# Patient Record
Sex: FEMALE | Race: OTHER | HISPANIC OR LATINO | Employment: UNEMPLOYED | URBAN - METROPOLITAN AREA
[De-identification: names, ages, dates, MRNs, and addresses within clinical notes are randomized per-mention and may not be internally consistent; named-entity substitution may affect disease eponyms.]

---

## 2022-11-09 ENCOUNTER — APPOINTMENT (EMERGENCY)
Dept: RADIOLOGY | Facility: HOSPITAL | Age: 47
End: 2022-11-09

## 2022-11-09 ENCOUNTER — HOSPITAL ENCOUNTER (EMERGENCY)
Facility: HOSPITAL | Age: 47
Discharge: HOME | End: 2022-11-10
Attending: EMERGENCY MEDICINE

## 2022-11-09 DIAGNOSIS — R05.9 COUGH: ICD-10-CM

## 2022-11-09 DIAGNOSIS — R06.02 SHORTNESS OF BREATH: ICD-10-CM

## 2022-11-09 DIAGNOSIS — R51.9 HEADACHE: Primary | ICD-10-CM

## 2022-11-09 DIAGNOSIS — I10 HIGH BLOOD PRESSURE: ICD-10-CM

## 2022-11-09 LAB
ALBUMIN SERPL BCP-MCNC: 3.6 G/DL (ref 3.5–5)
ALP SERPL-CCNC: 131 U/L (ref 46–116)
ALT SERPL W P-5'-P-CCNC: 31 U/L (ref 12–78)
ANION GAP SERPL CALCULATED.3IONS-SCNC: 7 MMOL/L (ref 4–13)
BASOPHILS # BLD AUTO: 0.05 THOUSANDS/ÂΜL (ref 0–0.1)
BASOPHILS NFR BLD AUTO: 0 % (ref 0–1)
BILIRUB SERPL-MCNC: 0.28 MG/DL (ref 0.2–1)
BUN SERPL-MCNC: 14 MG/DL (ref 5–25)
CALCIUM SERPL-MCNC: 9 MG/DL (ref 8.3–10.1)
CHLORIDE SERPL-SCNC: 102 MMOL/L (ref 96–108)
CO2 SERPL-SCNC: 31 MMOL/L (ref 21–32)
CREAT SERPL-MCNC: 0.83 MG/DL (ref 0.6–1.3)
EOSINOPHIL # BLD AUTO: 1.02 THOUSAND/ÂΜL (ref 0–0.61)
EOSINOPHIL NFR BLD AUTO: 7 % (ref 0–6)
ERYTHROCYTE [DISTWIDTH] IN BLOOD BY AUTOMATED COUNT: 13 % (ref 11.6–15.1)
GFR SERPL CREATININE-BSD FRML MDRD: 85 ML/MIN/1.73SQ M
GLUCOSE SERPL-MCNC: 124 MG/DL (ref 65–140)
HCT VFR BLD AUTO: 44.9 % (ref 34.8–46.1)
HGB BLD-MCNC: 14.4 G/DL (ref 11.5–15.4)
IMM GRANULOCYTES # BLD AUTO: 0.04 THOUSAND/UL (ref 0–0.2)
IMM GRANULOCYTES NFR BLD AUTO: 0 % (ref 0–2)
LYMPHOCYTES # BLD AUTO: 2.75 THOUSANDS/ÂΜL (ref 0.6–4.47)
LYMPHOCYTES NFR BLD AUTO: 19 % (ref 14–44)
MCH RBC QN AUTO: 26.7 PG (ref 26.8–34.3)
MCHC RBC AUTO-ENTMCNC: 32.1 G/DL (ref 31.4–37.4)
MCV RBC AUTO: 83 FL (ref 82–98)
MONOCYTES # BLD AUTO: 0.98 THOUSAND/ÂΜL (ref 0.17–1.22)
MONOCYTES NFR BLD AUTO: 7 % (ref 4–12)
NEUTROPHILS # BLD AUTO: 9.38 THOUSANDS/ÂΜL (ref 1.85–7.62)
NEUTS SEG NFR BLD AUTO: 67 % (ref 43–75)
NRBC BLD AUTO-RTO: 0 /100 WBCS
PLATELET # BLD AUTO: 285 THOUSANDS/UL (ref 149–390)
PMV BLD AUTO: 9.1 FL (ref 8.9–12.7)
POTASSIUM SERPL-SCNC: 3.9 MMOL/L (ref 3.5–5.3)
PROT SERPL-MCNC: 8.5 G/DL (ref 6.4–8.4)
RBC # BLD AUTO: 5.4 MILLION/UL (ref 3.81–5.12)
SODIUM SERPL-SCNC: 140 MMOL/L (ref 135–147)
WBC # BLD AUTO: 14.22 THOUSAND/UL (ref 4.31–10.16)

## 2022-11-09 RX ORDER — KETOROLAC TROMETHAMINE 30 MG/ML
15 INJECTION, SOLUTION INTRAMUSCULAR; INTRAVENOUS ONCE
Status: COMPLETED | OUTPATIENT
Start: 2022-11-09 | End: 2022-11-09

## 2022-11-09 RX ORDER — ALBUTEROL SULFATE 2.5 MG/3ML
5 SOLUTION RESPIRATORY (INHALATION) ONCE
Status: COMPLETED | OUTPATIENT
Start: 2022-11-09 | End: 2022-11-09

## 2022-11-09 RX ORDER — METOCLOPRAMIDE HYDROCHLORIDE 5 MG/ML
10 INJECTION INTRAMUSCULAR; INTRAVENOUS ONCE
Status: COMPLETED | OUTPATIENT
Start: 2022-11-10 | End: 2022-11-09

## 2022-11-09 RX ORDER — DIPHENHYDRAMINE HYDROCHLORIDE 50 MG/ML
25 INJECTION INTRAMUSCULAR; INTRAVENOUS ONCE
Status: COMPLETED | OUTPATIENT
Start: 2022-11-10 | End: 2022-11-09

## 2022-11-09 RX ADMIN — METOCLOPRAMIDE 10 MG: 5 INJECTION, SOLUTION INTRAMUSCULAR; INTRAVENOUS at 23:53

## 2022-11-09 RX ADMIN — KETOROLAC TROMETHAMINE 15 MG: 30 INJECTION, SOLUTION INTRAMUSCULAR; INTRAVENOUS at 23:17

## 2022-11-09 RX ADMIN — ALBUTEROL SULFATE 5 MG: 2.5 SOLUTION RESPIRATORY (INHALATION) at 23:18

## 2022-11-09 RX ADMIN — DIPHENHYDRAMINE HYDROCHLORIDE 25 MG: 50 INJECTION, SOLUTION INTRAMUSCULAR; INTRAVENOUS at 23:53

## 2022-11-09 RX ADMIN — IPRATROPIUM BROMIDE 0.5 MG: 0.5 SOLUTION RESPIRATORY (INHALATION) at 23:18

## 2022-11-09 RX ADMIN — SODIUM CHLORIDE 1000 ML: 0.9 INJECTION, SOLUTION INTRAVENOUS at 23:15

## 2022-11-10 VITALS
RESPIRATION RATE: 20 BRPM | OXYGEN SATURATION: 99 % | SYSTOLIC BLOOD PRESSURE: 168 MMHG | HEART RATE: 88 BPM | WEIGHT: 131.17 LBS | DIASTOLIC BLOOD PRESSURE: 101 MMHG | TEMPERATURE: 97.7 F

## 2022-11-10 LAB
FLUAV RNA RESP QL NAA+PROBE: NEGATIVE
FLUBV RNA RESP QL NAA+PROBE: NEGATIVE
RSV RNA RESP QL NAA+PROBE: NEGATIVE
SARS-COV-2 RNA RESP QL NAA+PROBE: NEGATIVE

## 2022-11-10 RX ORDER — GUAIFENESIN/DEXTROMETHORPHAN 100-10MG/5
5 SYRUP ORAL 3 TIMES DAILY PRN
Qty: 473 ML | Refills: 0 | Status: SHIPPED | OUTPATIENT
Start: 2022-11-10

## 2022-11-10 RX ORDER — ALBUTEROL SULFATE 90 UG/1
2 AEROSOL, METERED RESPIRATORY (INHALATION) EVERY 6 HOURS PRN
Qty: 6.7 G | Refills: 0 | Status: SHIPPED | OUTPATIENT
Start: 2022-11-10

## 2022-11-10 RX ORDER — IBUPROFEN 600 MG/1
600 TABLET ORAL EVERY 6 HOURS PRN
Qty: 60 TABLET | Refills: 0 | Status: SHIPPED | OUTPATIENT
Start: 2022-11-10

## 2022-11-10 NOTE — DISCHARGE INSTRUCTIONS
-peña Motrin 600 mg cada 6 horas para el dolor de chidi  -beber mucha agua  -use el inhalador de albuterol según sea necesario para la dificultad para respirar  -Programe joan skyla con el médico de [address] medicamentos para la tos según sea necesario

## 2022-11-10 NOTE — ED PROVIDER NOTES
History  Chief Complaint   Patient presents with   • Shortness of Breath     Pt reports having SOB and cough for the past couple of days  Pt reports shortness of breath when walking  Pt complains of a headache and pain in her lower back  Pt reports it hurts to breath  This is a 68-year-old female with no significant past medical history who presents today with shortness of breath, cough and headache  x2-3 days  Patient reports "my lungs hurt" when she breathes  Also has back pain when she takes a deep breath  States she gets very winded after only walking short distances  Patient also reports a frontal headache that was gradual in onset  Denies any changes in vision with this  Denies it worsening with position change, light or sound  Patient also admits to fever, when asked how high it was she states "it was low"  Patient admits to taking "lots of medications" however when asked specifically what she took today she states that she took ibuprofen around 1:30 p m   States that she took other medication 2 days ago and felt her symptoms got better however today they returned  Denies any abdominal pain, nausea, vomiting, diarrhea, chest pain, lightheadedness, dizziness  Language barrier: Yes, [employer] interpretor used  History obtained from: patient          None       History reviewed  No pertinent past medical history  History reviewed  No pertinent surgical history  History reviewed  No pertinent family history  I have reviewed and agree with the history as documented  E-Cigarette/Vaping     E-Cigarette/Vaping Substances     Social History     Tobacco Use   • Smoking status: Never Smoker   • Smokeless tobacco: Never Used   Substance Use Topics   • Alcohol use: Never   • Drug use: Never       Review of Systems   Constitutional: Positive for chills and fever  HENT: Positive for congestion  Eyes: Negative for photophobia     Respiratory: Positive for chest tightness and shortness of breath  Gastrointestinal: Negative for abdominal pain, diarrhea, nausea and vomiting  Musculoskeletal: Negative for neck pain  Skin: Negative for rash  Neurological: Positive for headaches  Negative for dizziness and light-headedness  All other systems reviewed and are negative  Physical Exam  Physical Exam  Vitals and nursing note reviewed  Constitutional:       General: She is in acute distress  Appearance: She is well-developed and normal weight  She is ill-appearing  HENT:      Head: Normocephalic and atraumatic  Eyes:      Conjunctiva/sclera: Conjunctivae normal    Cardiovascular:      Rate and Rhythm: Normal rate and regular rhythm  Heart sounds: No murmur heard  Pulmonary:      Effort: Pulmonary effort is normal  No respiratory distress  Breath sounds: Wheezing (expiratory wheezing throughout) present  Abdominal:      Palpations: Abdomen is soft  Tenderness: There is no abdominal tenderness  Musculoskeletal:      Cervical back: Neck supple  Skin:     General: Skin is warm and dry  Capillary Refill: Capillary refill takes less than 2 seconds  Neurological:      Mental Status: She is alert and oriented to person, place, and time     Psychiatric:         Mood and Affect: Mood normal          Behavior: Behavior normal          Vital Signs  ED Triage Vitals   Temperature Pulse Respirations Blood Pressure SpO2   11/09/22 2226 11/09/22 2226 11/09/22 2226 11/09/22 2226 11/09/22 2226   97 7 °F (36 5 °C) 82 20 (!) 182/116 97 %      Temp Source Heart Rate Source Patient Position - Orthostatic VS BP Location FiO2 (%)   11/09/22 2226 11/09/22 2226 11/09/22 2226 11/09/22 2226 --   Oral Monitor Sitting Right arm       Pain Score       11/09/22 2317       6           Vitals:    11/09/22 2226 11/10/22 0003   BP: (!) 182/116 (!) 168/101   Pulse: 82 88   Patient Position - Orthostatic VS: Sitting Lying         Visual Acuity      ED Medications  Medications   sodium chloride 0 9 % bolus 1,000 mL (1,000 mL Intravenous New Bag 11/9/22 2315)   albuterol inhalation solution 5 mg (5 mg Nebulization Given 11/9/22 2318)   ipratropium (ATROVENT) 0 02 % inhalation solution 0 5 mg (0 5 mg Nebulization Given 11/9/22 2318)   ketorolac (TORADOL) injection 15 mg (15 mg Intravenous Given 11/9/22 2317)   metoclopramide (REGLAN) injection 10 mg (10 mg Intravenous Given 11/9/22 2353)   diphenhydrAMINE (BENADRYL) injection 25 mg (25 mg Intravenous Given 11/9/22 2353)       Diagnostic Studies  Results Reviewed     Procedure Component Value Units Date/Time    FLU/RSV/COVID - if FLU/RSV clinically relevant [[id number]]  (Normal) Collected: 11/09/22 2301    Lab Status: Final result Specimen: Nares from Nose Updated: 11/10/22 0020     SARS-CoV-2 Negative     INFLUENZA A PCR Negative     INFLUENZA B PCR Negative     RSV PCR Negative    Narrative:      FOR PEDIATRIC PATIENTS - copy/paste COVID Guidelines URL to browser: [email or web address]  [employer]    SARS-CoV-2 assay is a Nucleic Acid Amplification assay intended for the  qualitative detection of nucleic acid from SARS-CoV-2 in nasopharyngeal  swabs  Results are for the presumptive identification of SARS-CoV-2 RNA  Positive results are indicative of infection with SARS-CoV-2, the virus  causing COVID-19, but do not rule out bacterial infection or co-infection  with other viruses  Laboratories within the [place] and its  territories are required to report all positive results to the appropriate  public health authorities  Negative results do not preclude SARS-CoV-2  infection and should not be used as the sole basis for treatment or other  patient management decisions  Negative results must be combined with  clinical observations, patient history, and epidemiological information  This test has not been FDA cleared or approved      This test has been authorized by FDA under an Emergency Use Authorization  (EUA)  This test is only authorized for the duration of time the  declaration that circumstances exist justifying the authorization of the  emergency use of an in vitro diagnostic tests for detection of SARS-CoV-2  virus and/or diagnosis of COVID-19 infection under section 564(b)(1) of  the Act, 21 U  S C  242OZC-5(H)(6), unless the authorization is terminated  or revoked sooner  The test has been validated but independent review by FDA  and CLIA is pending  Test performed using [employer] GeneXpert: This RT-PCR assay targets N2,  a region unique to SARS-CoV-2  A conserved region in the E-gene was chosen  for pan-Sarbecovirus detection which includes SARS-CoV-2  According to CMS-2020-01-R, this platform meets the definition of high-throughput technology      Comprehensive metabolic panel [[id number]]  (Abnormal) Collected: 11/09/22 2314    Lab Status: Final result Specimen: Blood from Arm, Left Updated: 11/09/22 9801     Sodium 140 mmol/L      Potassium 3 9 mmol/L      Chloride 102 mmol/L      CO2 31 mmol/L      ANION GAP 7 mmol/L      BUN 14 mg/dL      Creatinine 0 83 mg/dL      Glucose 124 mg/dL      Calcium 9 0 mg/dL      AST --     ALT 31 U/L      Alkaline Phosphatase 131 U/L      Total Protein 8 5 g/dL      Albumin 3 6 g/dL      Total Bilirubin 0 28 mg/dL      eGFR 85 ml/min/1 73sq m     Narrative:      NO AST RESULT IF NEEDED IT MUST BE ORDERED SEPARATELY  National Kidney Disease Foundation guidelines for Chronic Kidney Disease (CKD):   •  Stage 1 with normal or high GFR (GFR > 90 mL/min/1 73 square meters)  •  Stage 2 Mild CKD (GFR = 60-89 mL/min/1 73 square meters)  •  Stage 3A Moderate CKD (GFR = 45-59 mL/min/1 73 square meters)  •  Stage 3B Moderate CKD (GFR = 30-44 mL/min/1 73 square meters)  •  Stage 4 Severe CKD (GFR = 15-29 mL/min/1 73 square meters)  •  Stage 5 End Stage CKD (GFR <15 mL/min/1 73 square meters)  Note: GFR calculation is accurate only with a steady state creatinine    CBC and differential [[id number]]  (Abnormal) Collected: 11/09/22 2314    Lab Status: Final result Specimen: Blood from Arm, Left Updated: 11/09/22 2326     WBC 14 22 Thousand/uL      RBC 5 40 Million/uL      Hemoglobin 14 4 g/dL      Hematocrit 44 9 %      MCV 83 fL      MCH 26 7 pg      MCHC 32 1 g/dL      RDW 13 0 %      MPV 9 1 fL      Platelets 441 Thousands/uL      nRBC 0 /100 WBCs      Neutrophils Relative 67 %      Immat GRANS % 0 %      Lymphocytes Relative 19 %      Monocytes Relative 7 %      Eosinophils Relative 7 %      Basophils Relative 0 %      Neutrophils Absolute 9 38 Thousands/µL      Immature Grans Absolute 0 04 Thousand/uL      Lymphocytes Absolute 2 75 Thousands/µL      Monocytes Absolute 0 98 Thousand/µL      Eosinophils Absolute 1 02 Thousand/µL      Basophils Absolute 0 05 Thousands/µL                  XR chest 1 view portable   ED Interpretation by Jonas Amin PA-C (11/09 2315)   No acute cardiopulmonary disease                  Procedures  Procedures         ED Course  ED Course as of 11/10/22 0036   Wed Nov 09, 2022 2326 WBC(!): 14 22  Pt is non tachy, afebrile   2346 Wheezing has significantly improved  Pt still c/o headache  Will try migraine cocktail   Thu Nov 10, 2022   0524 Discussed results with pt  Still c/o "lung pain"  Discussed repeat DuoNeb but pt declined  When offered more medication for headache, she also declined  Stated she would like to go home and feels okay to go home                                SBIRT 20yo+    Flowsheet Row Most Recent Value   SBIRT (23 yo +)    In order to provide better care to our patients, we are screening all of our patients for alcohol and drug use  Would it be okay to ask you these screening questions?  No Filed at: 11/09/2022 2320                    MDM  Number of Diagnoses or Management Options  Cough: new and requires workup  Headache: new and requires workup  High blood pressure: new and requires workup  Shortness of breath: new and requires workup  Diagnosis management comments: This is a 53-year-old female who presents with cough, shortness of breath, subjective fevers and headache x2 days  Physical exam as noted above  CBC showed elevated WBC however patient is afebrile non tachy  CMP normal   Chest x-ray normal   Suspect a viral illness  Patient was given migraine cocktail and DuoNeb here in the ER with improvement of symptoms  Will send patient home with cough medicine, Motrin and albuterol inhaler  Discussed the importance of establishing care with a PCP  I have discussed the plan to discharge pt from ED  The patient was discharged in stable condition   Patient ambulated off the department   Extensive return to emergency department precautions were discussed   Follow up with appropriate providers including primary care physician was discussed   Patient and/or their  primary decision maker expressed understanding  Jermaine Penn remained stable during entire emergency department stay  Portions of the record may have been created with voice recognition software  Occasional wrong word or "sound a like" substitutions may have occurred due to the inherent limitations of voice recognition software  Read the chart carefully and recognize, using context, where substitutions have occurred           Amount and/or Complexity of Data Reviewed  Clinical lab tests: ordered and reviewed  Tests in the radiology section of CPT®: ordered and reviewed  Decide to obtain previous medical records or to obtain history from someone other than the patient: yes  Review and summarize past medical records: yes  Independent visualization of images, tracings, or specimens: yes    Patient Progress  Patient progress: stable      Disposition  Final diagnoses:   Headache   Shortness of breath   High blood pressure   Cough     Time reflects when diagnosis was documented in both MDM as applicable and the Disposition within this note     Time User Action Codes Description Comment    11/10/2022 12:30 AM Yanikristian Olvera Add [R51 9] Headache     11/10/2022 12:30 AM Yani Wade Add [R06 02] Shortness of breath     11/10/2022 12:30 AM Yani Wade Add [I10] High blood pressure     11/10/2022 12:33 AM Yani Wade Add [R05 9] Cough       ED Disposition     ED Disposition   Discharge    Condition   Stable    Date/Time   Thu Nov 10, 2022 12:31 AM    Comment   Brianna Delarosa discharge to home/self care                 Follow-up Information     Follow up With Specialties Details Why Contact Info Additional [address] [facility] Emergency Department Emergency Medicine  If symptoms worsen [address] [facility] [phone]  [address] [facility] Emergency Department, [address] [place] [address] Schedule an appointment as soon as possible for a visit   [address], [address] [facility] [address]  [address] [place] [address], [address], [address] [place], [place], [address] [place] [address]          Patient's Medications   Discharge Prescriptions    ALBUTEROL (PROVENTIL HFA) 90 MCG/ACT INHALER    Inhale 2 puffs every 6 (six) hours as needed for wheezing       Start Date: 11/10/2022End Date: --       Order Dose: 2 puffs       Quantity: 6 7 g    Refills: 0    DEXTROMETHORPHAN-GUAIFENESIN (ROBITUSSIN DM)  MG/5 ML SYRUP    Take 5 mL by mouth 3 (three) times a day as needed for cough       Start Date: 11/10/2022End Date: --       Order Dose: 5 mL       Quantity: 473 mL    Refills: 0    IBUPROFEN (MOTRIN) 600 MG TABLET    Take 1 tablet (600 mg total) by mouth every 6 (six) hours as needed for mild pain       Start Date: 11/10/2022End Date: --       Order Dose: 600 mg       Quantity: 60 tablet    Refills: 0           PDMP Review     None          ED Provider  Electronically Signed by           Daryl Duran PA-C  11/10/22 0035       Daryl Duran PA-C  11/10/22 2030

## 2022-12-01 ENCOUNTER — TELEPHONE (OUTPATIENT)
Dept: FAMILY MEDICINE CLINIC | Facility: CLINIC | Age: 47
End: 2022-12-01

## 2022-12-08 ENCOUNTER — OFFICE VISIT (OUTPATIENT)
Dept: FAMILY MEDICINE CLINIC | Facility: CLINIC | Age: 47
End: 2022-12-08

## 2022-12-08 VITALS
SYSTOLIC BLOOD PRESSURE: 116 MMHG | HEART RATE: 92 BPM | BODY MASS INDEX: 25.91 KG/M2 | DIASTOLIC BLOOD PRESSURE: 78 MMHG | WEIGHT: 132 LBS | OXYGEN SATURATION: 98 % | RESPIRATION RATE: 18 BRPM | TEMPERATURE: 97.3 F | HEIGHT: 60 IN

## 2022-12-08 DIAGNOSIS — R06.02 SHORTNESS OF BREATH: ICD-10-CM

## 2022-12-08 DIAGNOSIS — Z76.89 ENCOUNTER TO ESTABLISH CARE: Primary | ICD-10-CM

## 2022-12-08 DIAGNOSIS — Z23 ENCOUNTER FOR IMMUNIZATION: ICD-10-CM

## 2022-12-08 DIAGNOSIS — E78.2 MIXED HYPERLIPIDEMIA: ICD-10-CM

## 2022-12-08 DIAGNOSIS — R74.8 ELEVATED ALKALINE PHOSPHATASE LEVEL: ICD-10-CM

## 2022-12-08 DIAGNOSIS — R79.89 ABNORMAL CBC: ICD-10-CM

## 2022-12-08 DIAGNOSIS — R03.0 ELEVATED BP WITHOUT DIAGNOSIS OF HYPERTENSION: ICD-10-CM

## 2022-12-08 DIAGNOSIS — Z86.39 HISTORY OF VITAMIN D DEFICIENCY: ICD-10-CM

## 2022-12-08 PROBLEM — Z13.31 POSITIVE DEPRESSION SCREENING: Status: ACTIVE | Noted: 2022-03-04

## 2022-12-08 PROBLEM — R10.11 RIGHT UPPER QUADRANT ABDOMINAL PAIN: Status: ACTIVE | Noted: 2022-10-31

## 2022-12-08 PROBLEM — R53.83 FATIGUE: Status: ACTIVE | Noted: 2022-03-04

## 2022-12-08 PROBLEM — R42 DIZZINESS: Status: ACTIVE | Noted: 2022-03-04

## 2022-12-08 PROBLEM — G44.221 CHRONIC TENSION-TYPE HEADACHE, INTRACTABLE: Status: ACTIVE | Noted: 2022-10-31

## 2022-12-08 PROBLEM — Z78.9 LANGUAGE BARRIER AFFECTING HEALTH CARE: Status: ACTIVE | Noted: 2022-02-11

## 2022-12-08 PROBLEM — M79.10 MUSCULAR PAIN: Status: ACTIVE | Noted: 2022-03-04

## 2022-12-08 RX ORDER — OMEGA-3S/DHA/EPA/FISH OIL/D3 300MG-1000
1 CAPSULE ORAL DAILY
COMMUNITY

## 2022-12-08 RX ORDER — BLOOD PRESSURE TEST KIT
KIT MISCELLANEOUS DAILY
Qty: 1 KIT | Refills: 0 | Status: SHIPPED | OUTPATIENT
Start: 2022-12-08

## 2022-12-08 NOTE — PROGRESS NOTES
Name: Fuentes Seals      : 1975      MRN: [id number]  Encounter Provider: MAXIMO Walsh  Encounter Date: 2022   Encounter department: [address] [place] [address]     1  Encounter to establish care    2  Elevated BP without diagnosis of hypertension  Assessment & Plan:  Elevated BP at recent ED visit, patient concerned  Family hx of cardiac problems  Pt normotensive in office today  - Home BP monitoring x4 weeks varying time of day BP taken, return for follow up with BP log    - Avoid added salt in diet  - Daily physical activity  Orders:  -     Blood Pressure KIT; Use in the morning  -     CBC and differential; Future  -     Comprehensive metabolic panel; Future  -     Microalbumin / creatinine urine ratio    3  Mixed hyperlipidemia  Assessment & Plan:  Elevated lipids in May  No current treatment    - Recheck lipids, will consider statin due to strong family hx cardiac problems  Orders:  -     Lipid panel; Future    4  Elevated alkaline phosphatase level  Assessment & Plan:  - Recheck CMP, vitamin D level    Orders:  -     Comprehensive metabolic panel; Future  -     Vitamin D 25 hydroxy; Future    5  Shortness of breath  Assessment & Plan:  - Continue albuterol PRN  6  Abnormal CBC  -     CBC and differential; Future    7  History of vitamin D deficiency  -     Vitamin D 25 hydroxy; Future    8  Encounter for immunization  -     influenza vaccine, quadrivalent, 0 5 mL, preservative-free, for adult and pediatric patients 6 mos+ (AFLURIA, FLUARIX, FLULAVAL, FLUZONE)         Subjective     HPI     [employer]jiboutian language interpretation services were utilized for this visit  Chepe presented to the office to establish care  She was previously following with P buts states she was no longer comfortable with their care  Pt's main concern today is her blood pressure   BP was elevated at recent ED visit and pt reports strong family history of HTN and cardiac problems  Pt states she has never had treatment for high blood pressure  Does not have home cuff  No dizziness, changes in vision, chest pain, or palpitations  Pt has hx of headaches but states these have actually improved and she has not had any recently  Pt reports frequent shortness of breath and wheezing for past 6 months  States she has never had this problem in the past  Pt has been living in this area for the past 2 years since moving from [facility]  She seems to have environmental allergies in the winter here  Using albuterol 2 to 3 times per week  Review of Systems   Constitutional: Negative for fatigue, fever and unexpected weight change  HENT: Negative for congestion, rhinorrhea, sore throat and trouble swallowing  Eyes: Negative for visual disturbance  Respiratory: Positive for shortness of breath and wheezing  Negative for cough  Cardiovascular: Negative for chest pain, palpitations and leg swelling  Gastrointestinal: Negative for abdominal pain, diarrhea, nausea and vomiting  Genitourinary: Negative for difficulty urinating  Allergic/Immunologic: Positive for environmental allergies  Neurological: Negative for dizziness, weakness, numbness and headaches  All other systems reviewed and are negative  History reviewed  No pertinent past medical history    Past Surgical History:   Procedure Laterality Date   •  SECTION     • HYSTERECTOMY       Family History   Problem Relation Age of Onset   • Heart disease Mother    • Hypertension Father    • Hypertension Sister    • Heart attack Brother    • Prostate cancer Paternal Grandfather      Social History     Socioeconomic History   • Marital status: Single     Spouse name: None   • Number of children: 2   • Years of education: None   • Highest education level: None   Occupational History   • None   Tobacco Use   • Smoking status: Never   • Smokeless tobacco: Never   Vaping Use   • Vaping Use: Never used   Substance and Sexual Activity   • Alcohol use: Never   • Drug use: Never   • Sexual activity: Yes     Birth control/protection: None   Other Topics Concern   • None   Social History Narrative   • None     Social Determinants of Health     Financial Resource Strain: Not on file   Food Insecurity: Not on file   Transportation Needs: Not on file   Physical Activity: Not on file   Stress: Not on file   Social Connections: Not on file   Intimate Partner Violence: Not on file   Housing Stability: Not on file     Current Outpatient Medications on File Prior to Visit   Medication Sig   • albuterol (Proventil HFA) 90 mcg/act inhaler Inhale 2 puffs every 6 (six) hours as needed for wheezing   • B Complex Vitamins (B COMPLEX PO) Take by mouth   • Calcium Carbonate Antacid (CALCIUM CARBONATE PO) Take by mouth   • cholecalciferol (VITAMIN D3) 400 units tablet Take 1 tablet by mouth daily   • ibuprofen (MOTRIN) 600 mg tablet Take 1 tablet (600 mg total) by mouth every 6 (six) hours as needed for mild pain   • VITAMIN E PO Take by mouth   • [DISCONTINUED] dextromethorphan-guaiFENesin (ROBITUSSIN DM)  mg/5 mL syrup Take 5 mL by mouth 3 (three) times a day as needed for cough     Allergies   Allergen Reactions   • Piper Nasal Congestion     Immunization History   Administered Date(s) Administered   • COVID-19 MODERNA VACC 0 5 ML IM 03/27/2021, 04/21/2021, 12/09/2021   • Influenza, injectable, quadrivalent, preservative free 0 5 mL 12/08/2022       Objective     /78 (BP Location: Left arm, Patient Position: Sitting, Cuff Size: Adult)   Pulse 92   Temp (!) 97 3 °F (36 3 °C) (Temporal)   Resp 18   Ht 5' (1 524 m)   Wt 59 9 kg (132 lb)   SpO2 98%   BMI 25 78 kg/m²     Physical Exam  Vitals reviewed  Constitutional:       General: She is not in acute distress  Appearance: She is overweight  She is not ill-appearing or diaphoretic  HENT:      Head: Normocephalic and atraumatic  Right Ear: Tympanic membrane, ear canal and external ear normal       Left Ear: Tympanic membrane, ear canal and external ear normal       Nose: Nose normal       Mouth/Throat:      Mouth: Mucous membranes are moist       Pharynx: Oropharynx is clear  Eyes:      General: Lids are normal       Conjunctiva/sclera: Conjunctivae normal       Pupils: Pupils are equal, round, and reactive to light  Neck:      Thyroid: No thyromegaly or thyroid tenderness  Cardiovascular:      Rate and Rhythm: Normal rate and regular rhythm  Pulses: Normal pulses  Heart sounds: Normal heart sounds  No murmur heard  Pulmonary:      Effort: Pulmonary effort is normal  No tachypnea  Breath sounds: Normal breath sounds  No decreased breath sounds, wheezing or rales  Musculoskeletal:      Right lower leg: No edema  Left lower leg: No edema  Lymphadenopathy:      Cervical: No cervical adenopathy  Skin:     General: Skin is warm and dry  Neurological:      Mental Status: She is alert and oriented to person, place, and time     Psychiatric:         Mood and Affect: Mood and affect normal        MAXIMO Rucker

## 2022-12-09 PROBLEM — R06.02 SHORTNESS OF BREATH: Status: ACTIVE | Noted: 2022-12-09

## 2022-12-09 PROBLEM — R74.8 ELEVATED ALKALINE PHOSPHATASE LEVEL: Status: ACTIVE | Noted: 2022-12-09

## 2022-12-09 PROBLEM — R03.0 ELEVATED BP WITHOUT DIAGNOSIS OF HYPERTENSION: Status: ACTIVE | Noted: 2022-12-09

## 2022-12-09 PROBLEM — E78.2 MIXED HYPERLIPIDEMIA: Status: ACTIVE | Noted: 2022-12-09

## 2022-12-09 NOTE — ASSESSMENT & PLAN NOTE
Elevated BP at recent ED visit, patient concerned  Family hx of cardiac problems  Pt normotensive in office today  - Home BP monitoring x4 weeks varying time of day BP taken, return for follow up with BP log    - Avoid added salt in diet  - Daily physical activity

## 2022-12-09 NOTE — ASSESSMENT & PLAN NOTE
Elevated lipids in May  No current treatment    - Recheck lipids, will consider statin due to strong family hx cardiac problems

## 2022-12-21 ENCOUNTER — HOSPITAL ENCOUNTER (EMERGENCY)
Facility: HOSPITAL | Age: 47
Discharge: HOME | End: 2022-12-21
Attending: EMERGENCY MEDICINE

## 2022-12-21 ENCOUNTER — TELEPHONE (OUTPATIENT)
Dept: FAMILY MEDICINE CLINIC | Facility: CLINIC | Age: 47
End: 2022-12-21

## 2022-12-21 ENCOUNTER — APPOINTMENT (EMERGENCY)
Dept: RADIOLOGY | Facility: HOSPITAL | Age: 47
End: 2022-12-21

## 2022-12-21 VITALS
RESPIRATION RATE: 20 BRPM | DIASTOLIC BLOOD PRESSURE: 83 MMHG | OXYGEN SATURATION: 95 % | SYSTOLIC BLOOD PRESSURE: 138 MMHG | WEIGHT: 138.89 LBS | HEART RATE: 101 BPM | BODY MASS INDEX: 27.13 KG/M2 | TEMPERATURE: 98.6 F

## 2022-12-21 DIAGNOSIS — J20.9 ACUTE BRONCHITIS: Primary | ICD-10-CM

## 2022-12-21 RX ORDER — PREDNISONE 20 MG/1
60 TABLET ORAL ONCE
Status: COMPLETED | OUTPATIENT
Start: 2022-12-21 | End: 2022-12-21

## 2022-12-21 RX ORDER — ALBUTEROL SULFATE 2.5 MG/3ML
2.5 SOLUTION RESPIRATORY (INHALATION) EVERY 6 HOURS PRN
Qty: 75 ML | Refills: 0 | Status: SHIPPED | OUTPATIENT
Start: 2022-12-21

## 2022-12-21 RX ORDER — AZITHROMYCIN 250 MG/1
TABLET, FILM COATED ORAL
Qty: 6 TABLET | Refills: 0 | Status: SHIPPED | OUTPATIENT
Start: 2022-12-21 | End: 2022-12-25

## 2022-12-21 RX ORDER — PREDNISONE 20 MG/1
40 TABLET ORAL DAILY
Qty: 8 TABLET | Refills: 0 | Status: SHIPPED | OUTPATIENT
Start: 2022-12-21 | End: 2022-12-25

## 2022-12-21 RX ORDER — ALBUTEROL SULFATE 90 UG/1
2 AEROSOL, METERED RESPIRATORY (INHALATION) EVERY 6 HOURS PRN
Qty: 18 G | Refills: 0 | Status: SHIPPED | OUTPATIENT
Start: 2022-12-21

## 2022-12-21 RX ORDER — KETOROLAC TROMETHAMINE 30 MG/ML
30 INJECTION, SOLUTION INTRAMUSCULAR; INTRAVENOUS ONCE
Status: COMPLETED | OUTPATIENT
Start: 2022-12-21 | End: 2022-12-21

## 2022-12-21 RX ORDER — IPRATROPIUM BROMIDE AND ALBUTEROL SULFATE 2.5; .5 MG/3ML; MG/3ML
3 SOLUTION RESPIRATORY (INHALATION) ONCE
Status: COMPLETED | OUTPATIENT
Start: 2022-12-21 | End: 2022-12-21

## 2022-12-21 RX ORDER — IBUPROFEN 600 MG/1
600 TABLET ORAL EVERY 6 HOURS PRN
Qty: 20 TABLET | Refills: 0 | Status: SHIPPED | OUTPATIENT
Start: 2022-12-21

## 2022-12-21 RX ADMIN — KETOROLAC TROMETHAMINE 30 MG: 30 INJECTION, SOLUTION INTRAMUSCULAR; INTRAVENOUS at 10:24

## 2022-12-21 RX ADMIN — PREDNISONE 60 MG: 20 TABLET ORAL at 10:23

## 2022-12-21 RX ADMIN — IPRATROPIUM BROMIDE AND ALBUTEROL SULFATE 3 ML: .5; 3 SOLUTION RESPIRATORY (INHALATION) at 10:25

## 2022-12-21 NOTE — TELEPHONE ENCOUNTER
Pt left a msg in the nurse line requesting refill on Albuterol  Called pt and states that she went to the hospital already

## 2022-12-21 NOTE — Clinical Note
Dk Ochoa was seen and treated in our emergency department on 12/21/2022  Diagnosis:     Taye Cohen  may return to work on return date  She may return on this date: 12/26/2022         If you have any questions or concerns, please don't hesitate to call        Darinel Pearson MD    ______________________________           _______________          _______________  Hospital Representative                              Date                                Time

## 2022-12-21 NOTE — ED PROVIDER NOTES
History  Chief Complaint   Patient presents with   • Shortness of Breath     Shortness of breath, cough  and pain with breathing x 2 weeks  Pt reports becoming fatigued with ambulation  Seen for same prior and given albuterol  28-year-old female with cough, wheezing and shortness of breath for the past 2 days  She was seen in the ER 2 weeks ago and given an inhaler  She states that she got better but then got worse again 2 days ago  No fevers  No history of asthma but states she wheezes at times when she gets sick  She does not smoke or vape  When she gets wheezing she gets some chest tightness          Prior to Admission Medications   Prescriptions Last Dose Informant Patient Reported? Taking? B Complex Vitamins (B COMPLEX PO)   Yes No   Sig: Take by mouth   Blood Pressure KIT   No No   Sig: Use in the morning   Calcium Carbonate Antacid (CALCIUM CARBONATE PO)   Yes No   Sig: Take by mouth   VITAMIN E PO   Yes No   Sig: Take by mouth   albuterol (Proventil HFA) 90 mcg/act inhaler   No No   Sig: Inhale 2 puffs every 6 (six) hours as needed for wheezing   cholecalciferol (VITAMIN D3) 400 units tablet   Yes No   Sig: Take 1 tablet by mouth daily   ibuprofen (MOTRIN) 600 mg tablet   No No   Sig: Take 1 tablet (600 mg total) by mouth every 6 (six) hours as needed for mild pain      Facility-Administered Medications: None       History reviewed  No pertinent past medical history  Past Surgical History:   Procedure Laterality Date   •  SECTION     • HYSTERECTOMY         Family History   Problem Relation Age of Onset   • Heart disease Mother    • Hypertension Father    • Hypertension Sister    • Heart attack Brother    • Prostate cancer Paternal Grandfather      I have reviewed and agree with the history as documented      E-Cigarette/Vaping   • E-Cigarette Use Never User      E-Cigarette/Vaping Substances     Social History     Tobacco Use   • Smoking status: Never   • Smokeless tobacco: Never   Vaping Use   • Vaping Use: Never used   Substance Use Topics   • Alcohol use: Never   • Drug use: Never       Review of Systems   Constitutional: Negative for appetite change, fatigue and fever  HENT: Negative for rhinorrhea and sore throat  Eyes: Negative for pain  Respiratory: Positive for cough, shortness of breath and wheezing  Cardiovascular: Negative for chest pain and leg swelling  Gastrointestinal: Negative for abdominal pain, diarrhea and vomiting  Genitourinary: Negative for dysuria and flank pain  Musculoskeletal: Negative for back pain and neck pain  Skin: Negative for rash  Neurological: Negative for syncope and headaches  Psychiatric/Behavioral:        Mood normal       Physical Exam  Physical Exam  Vitals and nursing note reviewed  Constitutional:       Appearance: She is well-developed  HENT:      Head: Normocephalic and atraumatic  Comments: TMs clear bilaterally, oropharynx no erythema     Right Ear: External ear normal       Left Ear: External ear normal       Mouth/Throat:      Mouth: Mucous membranes are moist       Pharynx: No pharyngeal swelling or oropharyngeal exudate  Eyes:      General: No scleral icterus  Extraocular Movements: Extraocular movements intact  Cardiovascular:      Rate and Rhythm: Normal rate and regular rhythm  Pulmonary:      Effort: Pulmonary effort is normal  No respiratory distress  Comments: Occasional expiratory wheezing  Abdominal:      Palpations: Abdomen is soft  Tenderness: There is no abdominal tenderness  Musculoskeletal:         General: No deformity or signs of injury  Normal range of motion  Cervical back: Normal range of motion and neck supple  Skin:     General: Skin is warm and dry  Coloration: Skin is not jaundiced or pale  Neurological:      General: No focal deficit present  Mental Status: She is alert and oriented to person, place, and time     Psychiatric:         Mood and Affect: Mood normal          Behavior: Behavior normal          Vital Signs  ED Triage Vitals   Temperature Pulse Respirations Blood Pressure SpO2   12/21/22 0946 12/21/22 0946 12/21/22 0946 12/21/22 0946 12/21/22 0946   98 6 °F (37 °C) 101 20 138/83 95 %      Temp Source Heart Rate Source Patient Position - Orthostatic VS BP Location FiO2 (%)   12/21/22 0946 12/21/22 0946 12/21/22 0946 12/21/22 0946 --   Oral Monitor Sitting Right arm       Pain Score       12/21/22 1024       5           Vitals:    12/21/22 0946   BP: 138/83   Pulse: 101   Patient Position - Orthostatic VS: Sitting         Visual Acuity      ED Medications  Medications   ketorolac (TORADOL) injection 30 mg (30 mg Intramuscular Given 12/21/22 1024)   ipratropium-albuterol (DUO-NEB) 0 5-2 5 mg/3 mL inhalation solution 3 mL (3 mL Nebulization Given 12/21/22 1025)   predniSONE tablet 60 mg (60 mg Oral Given 12/21/22 1023)       Diagnostic Studies  Results Reviewed     Procedure Component Value Units Date/Time    FLU/RSV/COVID - if FLU/RSV clinically relevant [[id number]]  (Normal) Collected: 12/21/22 1018    Lab Status: Final result Specimen: Nares from Nasopharyngeal Swab Updated: 12/21/22 1119     SARS-CoV-2 Negative     INFLUENZA A PCR Negative     INFLUENZA B PCR Negative     RSV PCR Negative    Narrative:      FOR PEDIATRIC PATIENTS - copy/paste COVID Guidelines URL to browser: [email or web address]  [employer]    SARS-CoV-2 assay is a Nucleic Acid Amplification assay intended for the  qualitative detection of nucleic acid from SARS-CoV-2 in nasopharyngeal  swabs  Results are for the presumptive identification of SARS-CoV-2 RNA  Positive results are indicative of infection with SARS-CoV-2, the virus  causing COVID-19, but do not rule out bacterial infection or co-infection  with other viruses   Laboratories within the [place] and its  territories are required to report all positive results to the appropriate  public health authorities  Negative results do not preclude SARS-CoV-2  infection and should not be used as the sole basis for treatment or other  patient management decisions  Negative results must be combined with  clinical observations, patient history, and epidemiological information  This test has not been FDA cleared or approved  This test has been authorized by FDA under an Emergency Use Authorization  (EUA)  This test is only authorized for the duration of time the  declaration that circumstances exist justifying the authorization of the  emergency use of an in vitro diagnostic tests for detection of SARS-CoV-2  virus and/or diagnosis of COVID-19 infection under section 564(b)(1) of  the Act, 21 U  S C  411MOK-9(F)(5), unless the authorization is terminated  or revoked sooner  The test has been validated but independent review by FDA  and CLIA is pending  Test performed using [employer] Gene[employer]pert: This RT-PCR assay targets N2,  a region unique to SARS-CoV-2  A conserved region in the E-gene was chosen  for pan-Sarbecovirus detection which includes SARS-CoV-2  According to CMS-2020-01-R, this platform meets the definition of high-throughput technology  XR chest 2 views   Final Result by Sanaz Lynne MD (12/21 1130)      No acute cardiopulmonary disease  Workstation performed: [id number]                    Procedures  Procedures         ED Course                               SBIRT 22yo+    Flowsheet Row Most Recent Value   SBIRT (25 yo +)    In order to provide better care to our patients, we are screening all of our patients for alcohol and drug use  Would it be okay to ask you these screening questions?  No Filed at: 12/21/2022 1036                    MDM  Number of Diagnoses or Management Options     Amount and/or Complexity of Data Reviewed  Clinical lab tests: ordered and reviewed  Tests in the radiology section of CPT®: ordered and reviewed    Risk of Complications, Morbidity, and/or Mortality  Presenting problems: moderate  General comments: Pt  Felt better in ER after neb/meds  I gave her a nebulizer from here to take home  She feels like it works better  Disposition  Final diagnoses:   Acute bronchitis     Time reflects when diagnosis was documented in both MDM as applicable and the Disposition within this note     Time User Action Codes Description Comment    12/21/2022 11:32 AM Fernando LAGUERRE Add [J20 9] Acute bronchitis       ED Disposition     ED Disposition   Discharge    Condition   Stable    Date/Time   Wed Dec 21, 2022 11:32 AM    Comment   [address] [facility] discharge to home/self care  Follow-up Information     Follow up With Specialties Details Why Contact Info    Chacha Davalos, [address] Nurse Practitioner, Family [address]  [facility] [address]  [phone]      Chacha Davalos [address] Nurse Practitioner, Family Medicine   [address] [place] [address]  [facility] [address]  [phone]            Discharge Medication List as of 12/21/2022 11:34 AM      START taking these medications    Details   !! albuterol (Ventolin HFA) 90 mcg/act inhaler Inhale 2 puffs every 6 (six) hours as needed for wheezing, Starting Wed 12/21/2022, Normal      azithromycin (ZITHROMAX) 250 mg tablet Take 2 tablets today then 1 tablet daily x 4 days, Normal      !! ibuprofen (MOTRIN) 600 mg tablet Take 1 tablet (600 mg total) by mouth every 6 (six) hours as needed for moderate pain, Starting Wed 12/21/2022, Normal      predniSONE 20 mg tablet Take 2 tablets (40 mg total) by mouth daily for 4 days, Starting Wed 12/21/2022, Until Sun 12/25/2022, Normal       !! - Potential duplicate medications found  Please discuss with provider        CONTINUE these medications which have NOT CHANGED    Details   !! albuterol (Proventil HFA) 90 mcg/act inhaler Inhale 2 puffs every 6 (six) hours as needed for wheezing, Starting Thu 11/10/2022, Normal      B Complex Vitamins (B COMPLEX PO) Take by mouth, Historical Med      Blood Pressure KIT Use in the morning, Starting u 12/8/2022, Normal      Calcium Carbonate Antacid (CALCIUM CARBONATE PO) Take by mouth, Historical Med      cholecalciferol (VITAMIN D3) 400 units tablet Take 1 tablet by mouth daily, Historical Med      !! ibuprofen (MOTRIN) 600 mg tablet Take 1 tablet (600 mg total) by mouth every 6 (six) hours as needed for mild pain, Starting u 11/10/2022, Normal      VITAMIN E PO Take by mouth, Historical Med       !! - Potential duplicate medications found  Please discuss with provider  No discharge procedures on file      PDMP Review     None          ED Provider  Electronically Signed by           Daniel Chang MD  12/21/22 6533

## 2023-01-09 ENCOUNTER — APPOINTMENT (OUTPATIENT)
Dept: LAB | Facility: CLINIC | Age: 48
End: 2023-01-09

## 2023-01-09 DIAGNOSIS — R79.89 ABNORMAL CBC: ICD-10-CM

## 2023-01-09 DIAGNOSIS — R03.0 ELEVATED BP WITHOUT DIAGNOSIS OF HYPERTENSION: ICD-10-CM

## 2023-01-09 DIAGNOSIS — Z86.39 HISTORY OF VITAMIN D DEFICIENCY: ICD-10-CM

## 2023-01-09 DIAGNOSIS — R74.8 ELEVATED ALKALINE PHOSPHATASE LEVEL: ICD-10-CM

## 2023-01-09 DIAGNOSIS — E78.2 MIXED HYPERLIPIDEMIA: ICD-10-CM

## 2023-01-09 LAB
25(OH)D3 SERPL-MCNC: 20 NG/ML (ref 30–100)
ALBUMIN SERPL BCP-MCNC: 3.5 G/DL (ref 3.5–5)
ALP SERPL-CCNC: 83 U/L (ref 46–116)
ALT SERPL W P-5'-P-CCNC: 25 U/L (ref 12–78)
ANION GAP SERPL CALCULATED.3IONS-SCNC: 4 MMOL/L (ref 4–13)
AST SERPL W P-5'-P-CCNC: 18 U/L (ref 5–45)
BASOPHILS # BLD AUTO: 0.03 THOUSANDS/ÂΜL (ref 0–0.1)
BASOPHILS NFR BLD AUTO: 1 % (ref 0–1)
BILIRUB SERPL-MCNC: 0.3 MG/DL (ref 0.2–1)
BUN SERPL-MCNC: 9 MG/DL (ref 5–25)
CALCIUM SERPL-MCNC: 9.4 MG/DL (ref 8.3–10.1)
CHLORIDE SERPL-SCNC: 106 MMOL/L (ref 96–108)
CHOLEST SERPL-MCNC: 262 MG/DL
CO2 SERPL-SCNC: 29 MMOL/L (ref 21–32)
CREAT SERPL-MCNC: 0.69 MG/DL (ref 0.6–1.3)
CREAT UR-MCNC: 246 MG/DL
EOSINOPHIL # BLD AUTO: 0.27 THOUSAND/ÂΜL (ref 0–0.61)
EOSINOPHIL NFR BLD AUTO: 5 % (ref 0–6)
ERYTHROCYTE [DISTWIDTH] IN BLOOD BY AUTOMATED COUNT: 13.3 % (ref 11.6–15.1)
GFR SERPL CREATININE-BSD FRML MDRD: 105 ML/MIN/1.73SQ M
GLUCOSE P FAST SERPL-MCNC: 87 MG/DL (ref 65–99)
HCT VFR BLD AUTO: 41.9 % (ref 34.8–46.1)
HDLC SERPL-MCNC: 53 MG/DL
HGB BLD-MCNC: 13.1 G/DL (ref 11.5–15.4)
IMM GRANULOCYTES # BLD AUTO: 0 THOUSAND/UL (ref 0–0.2)
IMM GRANULOCYTES NFR BLD AUTO: 0 % (ref 0–2)
LDLC SERPL CALC-MCNC: 194 MG/DL (ref 0–100)
LYMPHOCYTES # BLD AUTO: 2.46 THOUSANDS/ÂΜL (ref 0.6–4.47)
LYMPHOCYTES NFR BLD AUTO: 44 % (ref 14–44)
MCH RBC QN AUTO: 26.7 PG (ref 26.8–34.3)
MCHC RBC AUTO-ENTMCNC: 31.3 G/DL (ref 31.4–37.4)
MCV RBC AUTO: 86 FL (ref 82–98)
MICROALBUMIN UR-MCNC: 14.8 MG/L (ref 0–20)
MICROALBUMIN/CREAT 24H UR: 6 MG/G CREATININE (ref 0–30)
MONOCYTES # BLD AUTO: 0.47 THOUSAND/ÂΜL (ref 0.17–1.22)
MONOCYTES NFR BLD AUTO: 9 % (ref 4–12)
NEUTROPHILS # BLD AUTO: 2.24 THOUSANDS/ÂΜL (ref 1.85–7.62)
NEUTS SEG NFR BLD AUTO: 41 % (ref 43–75)
NONHDLC SERPL-MCNC: 209 MG/DL
NRBC BLD AUTO-RTO: 0 /100 WBCS
PLATELET # BLD AUTO: 308 THOUSANDS/UL (ref 149–390)
PMV BLD AUTO: 9.8 FL (ref 8.9–12.7)
POTASSIUM SERPL-SCNC: 4.1 MMOL/L (ref 3.5–5.3)
PROT SERPL-MCNC: 7.6 G/DL (ref 6.4–8.4)
RBC # BLD AUTO: 4.9 MILLION/UL (ref 3.81–5.12)
SODIUM SERPL-SCNC: 139 MMOL/L (ref 135–147)
TRIGL SERPL-MCNC: 74 MG/DL
WBC # BLD AUTO: 5.47 THOUSAND/UL (ref 4.31–10.16)

## 2023-01-10 DIAGNOSIS — E78.2 MIXED HYPERLIPIDEMIA: Primary | ICD-10-CM

## 2023-01-10 DIAGNOSIS — E55.9 VITAMIN D DEFICIENCY: ICD-10-CM

## 2023-01-10 RX ORDER — CHOLECALCIFEROL (VITAMIN D3) 50 MCG
2000 TABLET ORAL DAILY
Qty: 90 TABLET | Refills: 1 | Status: SHIPPED | OUTPATIENT
Start: 2023-01-10

## 2023-01-10 RX ORDER — ATORVASTATIN CALCIUM 20 MG/1
20 TABLET, FILM COATED ORAL DAILY
Qty: 90 TABLET | Refills: 1 | Status: SHIPPED | OUTPATIENT
Start: 2023-01-10

## 2023-01-16 ENCOUNTER — HOSPITAL ENCOUNTER (EMERGENCY)
Facility: HOSPITAL | Age: 48
Discharge: HOME | End: 2023-01-16
Attending: EMERGENCY MEDICINE

## 2023-01-16 ENCOUNTER — APPOINTMENT (EMERGENCY)
Dept: RADIOLOGY | Facility: HOSPITAL | Age: 48
End: 2023-01-16

## 2023-01-16 VITALS
OXYGEN SATURATION: 97 % | HEART RATE: 102 BPM | SYSTOLIC BLOOD PRESSURE: 141 MMHG | DIASTOLIC BLOOD PRESSURE: 105 MMHG | RESPIRATION RATE: 15 BRPM | TEMPERATURE: 97.8 F

## 2023-01-16 DIAGNOSIS — J20.9 ACUTE BRONCHITIS: Primary | ICD-10-CM

## 2023-01-16 DIAGNOSIS — J06.9 VIRAL URI: ICD-10-CM

## 2023-01-16 LAB
ATRIAL RATE: 102 BPM
FLUAV RNA RESP QL NAA+PROBE: NEGATIVE
FLUBV RNA RESP QL NAA+PROBE: NEGATIVE
P AXIS: 74 DEGREES
PR INTERVAL: 136 MS
QRS AXIS: 67 DEGREES
QRSD INTERVAL: 76 MS
QT INTERVAL: 320 MS
QTC INTERVAL: 417 MS
RSV RNA RESP QL NAA+PROBE: NEGATIVE
SARS-COV-2 RNA RESP QL NAA+PROBE: NEGATIVE
T WAVE AXIS: 75 DEGREES
VENTRICULAR RATE: 102 BPM

## 2023-01-16 RX ORDER — IPRATROPIUM BROMIDE AND ALBUTEROL SULFATE 2.5; .5 MG/3ML; MG/3ML
3 SOLUTION RESPIRATORY (INHALATION) ONCE
Status: COMPLETED | OUTPATIENT
Start: 2023-01-16 | End: 2023-01-16

## 2023-01-16 RX ORDER — PREDNISONE 20 MG/1
40 TABLET ORAL DAILY
Qty: 8 TABLET | Refills: 0 | Status: SHIPPED | OUTPATIENT
Start: 2023-01-16 | End: 2023-01-20

## 2023-01-16 RX ORDER — ALBUTEROL SULFATE 2.5 MG/3ML
1 SOLUTION RESPIRATORY (INHALATION) ONCE
Status: COMPLETED | OUTPATIENT
Start: 2023-01-16 | End: 2023-01-16

## 2023-01-16 RX ORDER — FLUTICASONE PROPIONATE 50 MCG
1 SPRAY, SUSPENSION (ML) NASAL DAILY
Qty: 16 G | Refills: 0 | Status: SHIPPED | OUTPATIENT
Start: 2023-01-16

## 2023-01-16 RX ORDER — IBUPROFEN 600 MG/1
600 TABLET ORAL EVERY 6 HOURS PRN
Qty: 20 TABLET | Refills: 0 | Status: SHIPPED | OUTPATIENT
Start: 2023-01-16

## 2023-01-16 RX ORDER — AZITHROMYCIN 250 MG/1
TABLET, FILM COATED ORAL
Qty: 6 TABLET | Refills: 0 | Status: SHIPPED | OUTPATIENT
Start: 2023-01-16 | End: 2023-01-20

## 2023-01-16 RX ORDER — ALBUTEROL SULFATE 90 UG/1
2 AEROSOL, METERED RESPIRATORY (INHALATION) EVERY 6 HOURS PRN
Qty: 18 G | Refills: 0 | Status: SHIPPED | OUTPATIENT
Start: 2023-01-16

## 2023-01-16 RX ORDER — ALBUTEROL SULFATE 2.5 MG/3ML
2.5 SOLUTION RESPIRATORY (INHALATION) EVERY 6 HOURS PRN
Qty: 75 ML | Refills: 0 | Status: SHIPPED | OUTPATIENT
Start: 2023-01-16

## 2023-01-16 RX ORDER — CETIRIZINE HYDROCHLORIDE, PSEUDOEPHEDRINE HYDROCHLORIDE 5; 120 MG/1; MG/1
1 TABLET, FILM COATED, EXTENDED RELEASE ORAL 2 TIMES DAILY
Qty: 20 TABLET | Refills: 0 | Status: SHIPPED | OUTPATIENT
Start: 2023-01-16

## 2023-01-16 RX ORDER — PREDNISONE 20 MG/1
60 TABLET ORAL ONCE
Status: COMPLETED | OUTPATIENT
Start: 2023-01-16 | End: 2023-01-16

## 2023-01-16 RX ORDER — IPRATROPIUM BROMIDE AND ALBUTEROL SULFATE .5; 3 MG/3ML; MG/3ML
1 SOLUTION RESPIRATORY (INHALATION) ONCE
Status: COMPLETED | OUTPATIENT
Start: 2023-01-16 | End: 2023-01-16

## 2023-01-16 RX ADMIN — IPRATROPIUM BROMIDE AND ALBUTEROL SULFATE 3 ML: 2.5; .5 SOLUTION RESPIRATORY (INHALATION) at 13:38

## 2023-01-16 RX ADMIN — PREDNISONE 60 MG: 20 TABLET ORAL at 13:37

## 2023-01-16 NOTE — ED PROVIDER NOTES
History  Chief Complaint   Patient presents with   • Shortness of Breath     Pt called EMS for increased SOB  Was seen about 1 month ago for the same but now sx have occurred again  Has home meds but they are not helping  Made some type of herbal remedy/drink at home this morning and became SOB after  Also c/o 9/10 HA upon waking and now it is about a 2/10        40 y/o female with sob, cough,wheezing, congestion, not feeling well  +headache, no fevers  Nonsmoker, no h/o asthma    No cp          Prior to Admission Medications   Prescriptions Last Dose Informant Patient Reported? Taking?    B Complex Vitamins (B COMPLEX PO) Not Taking  Yes No   Sig: Take by mouth   Patient not taking: Reported on 2023   Blood Pressure KIT   No No   Sig: Use in the morning   Calcium Carbonate Antacid (CALCIUM CARBONATE PO)   Yes Yes   Sig: Take by mouth   Cholecalciferol (Vitamin D) 50 MCG (2000 UT) tablet   No Yes   Sig: Take 1 tablet (2,000 Units total) by mouth daily   VITAMIN E PO   Yes Yes   Sig: Take by mouth   albuterol (2 5 mg/3 mL) 0 083 % nebulizer solution   No Yes   Sig: Take 3 mL (2 5 mg total) by nebulization every 6 (six) hours as needed for wheezing or shortness of breath   albuterol (Ventolin HFA) 90 mcg/act inhaler   No Yes   Sig: Inhale 2 puffs every 6 (six) hours as needed for wheezing   atorvastatin (LIPITOR) 20 mg tablet   No Yes   Sig: Take 1 tablet (20 mg total) by mouth daily   ibuprofen (MOTRIN) 600 mg tablet   No No   Sig: Take 1 tablet (600 mg total) by mouth every 6 (six) hours as needed for moderate pain      Facility-Administered Medications: None       Past Medical History:   Diagnosis Date   • High cholesterol    • Hypertension        Past Surgical History:   Procedure Laterality Date   •  SECTION     • HYSTERECTOMY         Family History   Problem Relation Age of Onset   • Heart disease Mother    • Hypertension Father    • Hypertension Sister    • Heart attack Brother    • Prostate cancer Paternal Grandfather      I have reviewed and agree with the history as documented  E-Cigarette/Vaping   • E-Cigarette Use Never User      E-Cigarette/Vaping Substances     Social History     Tobacco Use   • Smoking status: Never   • Smokeless tobacco: Never   Vaping Use   • Vaping Use: Never used   Substance Use Topics   • Alcohol use: Never   • Drug use: Never       Review of Systems   Constitutional: Positive for fatigue  Negative for appetite change and fever  HENT: Positive for congestion and rhinorrhea  Negative for sore throat  Eyes: Negative for pain  Respiratory: Positive for cough, shortness of breath and wheezing  Cardiovascular: Negative for chest pain and leg swelling  Gastrointestinal: Negative for abdominal pain, diarrhea and vomiting  Genitourinary: Negative for dysuria and flank pain  Musculoskeletal: Negative for back pain and neck pain  Skin: Negative for rash  Neurological: Positive for weakness and headaches  Negative for syncope  Psychiatric/Behavioral:        Mood normal       Physical Exam  Physical Exam  Vitals and nursing note reviewed  Constitutional:       Appearance: She is well-developed  HENT:      Head: Normocephalic and atraumatic  Right Ear: External ear normal       Left Ear: External ear normal    Eyes:      General: No scleral icterus  Extraocular Movements: Extraocular movements intact  Cardiovascular:      Rate and Rhythm: Normal rate and regular rhythm  Pulmonary:      Effort: Pulmonary effort is normal  No respiratory distress  Comments: +occasional exp  wheezing  Abdominal:      Palpations: Abdomen is soft  Tenderness: There is no abdominal tenderness  Musculoskeletal:         General: No deformity or signs of injury  Normal range of motion  Cervical back: Normal range of motion and neck supple  Skin:     General: Skin is warm and dry  Coloration: Skin is not jaundiced or pale     Neurological: General: No focal deficit present  Mental Status: She is alert and oriented to person, place, and time  Psychiatric:         Mood and Affect: Mood normal          Behavior: Behavior normal          Vital Signs  ED Triage Vitals [01/16/23 1247]   Temperature Pulse Respirations Blood Pressure SpO2   97 8 °F (36 6 °C) (!) 106 19 (!) 199/91 98 %      Temp Source Heart Rate Source Patient Position - Orthostatic VS BP Location FiO2 (%)   Oral -- -- -- --      Pain Score       2           Vitals:    01/16/23 1247 01/16/23 1340 01/16/23 1515   BP: (!) 199/91 133/91 (!) 141/105   Pulse: (!) 106 93 102         Visual Acuity      ED Medications  Medications   albuterol (FOR EMS ONLY) (2 5 mg/3 mL) 0 083 % inhalation solution 2 5 mg (0 mg Does not apply Given to EMS 1/16/23 1242)   ipratropium-albuterol (FOR EMS ONLY) (DUO-NEB) 0 5-2 5 mg/3 mL inhalation solution 3 mL (0 mL Does not apply Given to EMS 1/16/23 1242)   ipratropium-albuterol (DUO-NEB) 0 5-2 5 mg/3 mL inhalation solution 3 mL (3 mL Nebulization Given 1/16/23 1338)   predniSONE tablet 60 mg (60 mg Oral Given 1/16/23 1337)       Diagnostic Studies  Results Reviewed     Procedure Component Value Units Date/Time    FLU/RSV/COVID - if FLU/RSV clinically relevant [[id number]]  (Normal) Collected: 01/16/23 1341    Lab Status: Final result Specimen: Nares from Nasopharyngeal Swab Updated: 01/16/23 1434     SARS-CoV-2 Negative     INFLUENZA A PCR Negative     INFLUENZA B PCR Negative     RSV PCR Negative    Narrative:      FOR PEDIATRIC PATIENTS - copy/paste COVID Guidelines URL to browser: [email or web address] [email or web address]  [employer]    SARS-CoV-2 assay is a Nucleic Acid Amplification assay intended for the  qualitative detection of nucleic acid from SARS-CoV-2 in nasopharyngeal  swabs  Results are for the presumptive identification of SARS-CoV-2 RNA      Positive results are indicative of infection with SARS-CoV-2, the virus  causing COVID-19, but do not rule out bacterial infection or co-infection  with other viruses  Laboratories within the [place] and its  territories are required to report all positive results to the appropriate  public health authorities  Negative results do not preclude SARS-CoV-2  infection and should not be used as the sole basis for treatment or other  patient management decisions  Negative results must be combined with  clinical observations, patient history, and epidemiological information  This test has not been FDA cleared or approved  This test has been authorized by FDA under an Emergency Use Authorization  (EUA)  This test is only authorized for the duration of time the  declaration that circumstances exist justifying the authorization of the  emergency use of an in vitro diagnostic tests for detection of SARS-CoV-2  virus and/or diagnosis of COVID-19 infection under section 564(b)(1) of  the Act, 21 U  S C  823TEL-4(B)(4), unless the authorization is terminated  or revoked sooner  The test has been validated but independent review by FDA  and CLIA is pending  Test performed using [employer] Gene[employer]pert: This RT-PCR assay targets N2,  a region unique to SARS-CoV-2  A conserved region in the E-gene was chosen  for pan-Sarbecovirus detection which includes SARS-CoV-2  According to CMS-2020-01-R, this platform meets the definition of high-throughput technology  XR chest 2 views   Final Result by Zenia Gutiérrez MD (01/16 0048)      No acute cardiopulmonary disease  Workstation performed: [id number]                    Procedures  Procedures         ED Course                               SBIRT 20yo+    Flowsheet Row Most Recent Value   SBIRT (25 yo +)    In order to provide better care to our patients, we are screening all of our patients for alcohol and drug use  Would it be okay to ask you these screening questions?  No Filed at: 01/16/2023 7888 Medical Decision Making  CXR nad    Pt  [place] better after nebs/meds given here  Acute bronchitis: acute illness or injury  Viral URI: acute illness or injury  Amount and/or Complexity of Data Reviewed  Radiology: ordered  Risk  OTC drugs  Prescription drug management  Disposition  Final diagnoses:   Acute bronchitis   Viral URI     Time reflects when diagnosis was documented in both MDM as applicable and the Disposition within this note     Time User Action Codes Description Comment    1/16/2023  3:10 PM Keerthi Hendrickson Add [J20 9] Acute bronchitis     1/16/2023  3:10 PM Amanda Carbone Add [J06 9] Viral URI       ED Disposition     ED Disposition   Discharge    Condition   Stable    Date/Time   Mon Jan 16, 2023  3:10 PM    Comment   [facility] [address] discharge to home/self care                 Follow-up Information     Follow up With Specialties Details Why Contact Info Additional Information    Kassy August, [facility] Nurse Practitioner, Family Medicine   [address] [place] [address]  [place] [address]  [phone]       [facility] [place] [address] Pulmonology   [facility] [address] [facility] [address] [place] [address] 38          Discharge Medication List as of 1/16/2023  3:12 PM      START taking these medications    Details   azithromycin (ZITHROMAX) 250 mg tablet Take 2 tablets today then 1 tablet daily x 4 days, Normal      cetirizine-pseudoephedrine (ZyrTEC-D) 5-120 MG per tablet Take 1 tablet by mouth 2 (two) times a day, Starting Mon 1/16/2023, Normal      fluticasone (FLONASE) 50 mcg/act nasal spray 1 spray into each nostril daily, Starting Mon 1/16/2023, Normal      !! ibuprofen (MOTRIN) 600 mg tablet Take 1 tablet (600 mg total) by mouth every 6 (six) hours as needed for moderate pain, Starting Mon 1/16/2023, Normal      predniSONE 20 mg tablet Take 2 tablets (40 mg total) by mouth daily for 4 days, Starting Mon 1/16/2023, Until Fri 1/20/2023, Normal       !! - Potential duplicate medications found  Please discuss with provider  CONTINUE these medications which have NOT CHANGED    Details   albuterol (2 5 mg/3 mL) 0 083 % nebulizer solution Take 3 mL (2 5 mg total) by nebulization every 6 (six) hours as needed for wheezing or shortness of breath, Starting Wed 12/21/2022, Normal      albuterol (Ventolin HFA) 90 mcg/act inhaler Inhale 2 puffs every 6 (six) hours as needed for wheezing, Starting Wed 12/21/2022, Normal      atorvastatin (LIPITOR) 20 mg tablet Take 1 tablet (20 mg total) by mouth daily, Starting Tue 1/10/2023, Normal      Calcium Carbonate Antacid (CALCIUM CARBONATE PO) Take by mouth, Historical Med      Cholecalciferol (Vitamin D) 50 MCG (2000 UT) tablet Take 1 tablet (2,000 Units total) by mouth daily, Starting Tue 1/10/2023, Normal      VITAMIN E PO Take by mouth, Historical Med      B Complex Vitamins (B COMPLEX PO) Take by mouth, Historical Med      Blood Pressure KIT Use in the morning, Starting Thu 12/8/2022, Normal      !! ibuprofen (MOTRIN) 600 mg tablet Take 1 tablet (600 mg total) by mouth every 6 (six) hours as needed for moderate pain, Starting Wed 12/21/2022, Normal       !! - Potential duplicate medications found  Please discuss with provider  No discharge procedures on file      PDMP Review     None          ED Provider  Electronically Signed by           Anna Healy MD  01/16/23 8091

## 2023-02-02 ENCOUNTER — OFFICE VISIT (OUTPATIENT)
Dept: FAMILY MEDICINE CLINIC | Facility: CLINIC | Age: 48
End: 2023-02-02

## 2023-02-02 VITALS
WEIGHT: 134 LBS | RESPIRATION RATE: 18 BRPM | HEART RATE: 89 BPM | BODY MASS INDEX: 26.31 KG/M2 | HEIGHT: 60 IN | TEMPERATURE: 97.6 F | OXYGEN SATURATION: 96 % | SYSTOLIC BLOOD PRESSURE: 126 MMHG | DIASTOLIC BLOOD PRESSURE: 82 MMHG

## 2023-02-02 DIAGNOSIS — E78.2 MIXED HYPERLIPIDEMIA: ICD-10-CM

## 2023-02-02 DIAGNOSIS — R74.8 ELEVATED ALKALINE PHOSPHATASE LEVEL: ICD-10-CM

## 2023-02-02 DIAGNOSIS — R03.0 ELEVATED BP WITHOUT DIAGNOSIS OF HYPERTENSION: Primary | ICD-10-CM

## 2023-02-02 DIAGNOSIS — E55.9 VITAMIN D DEFICIENCY: ICD-10-CM

## 2023-02-02 RX ORDER — ROSUVASTATIN CALCIUM 10 MG/1
10 TABLET, COATED ORAL DAILY
Qty: 30 TABLET | Refills: 2 | Status: SHIPPED | OUTPATIENT
Start: 2023-02-02

## 2023-02-02 NOTE — ASSESSMENT & PLAN NOTE
Lab Results   Component Value Date    CHOLESTEROL 262 (H) 01/09/2023     Lab Results   Component Value Date    HDL 53 01/09/2023     Lab Results   Component Value Date    TRIG 74 01/09/2023     Lab Results   Component Value Date    [place] 209 01/09/2023     Lab Results   Component Value Date    LDLCALC 194 (H) 01/09/2023     Treatment with atorvastatin started 1/10/23 but pt reports medication causes 3 to 4 hours of abdominal pain daily after taking    - Stop atorvastatin, start rosuvastatin 10 mg daily  Call if continuing to have side effects     - Repeat lipids in 3 months

## 2023-02-02 NOTE — ASSESSMENT & PLAN NOTE
Lab Results   Component Value Date    ALKPHOS 83 01/09/2023    ALKPHOS 131 (H) 11/09/2022     Now WNL  - Monitor

## 2023-02-02 NOTE — PROGRESS NOTES
Name: Guerline Sawant      : 1975      MRN: [id number]  Encounter Provider: MAXIMO Christopher  Encounter Date: 2023   Encounter department: [address] [place] [address]     1  Elevated BP without diagnosis of hypertension  Assessment & Plan:  BP at goal in office today: 126/82   - Continue home BP monitoring  Call if BPs >140/90   - Continue low-salt diet and daily physical activity  Orders:  -     CBC and differential; Future  -     Comprehensive metabolic panel; Future    2  Mixed hyperlipidemia  Assessment & Plan:  Lab Results   Component Value Date    CHOLESTEROL 262 (H) 2023     Lab Results   Component Value Date    HDL 53 2023     Lab Results   Component Value Date    TRIG 74 2023     Lab Results   Component Value Date    [place] 209 2023     Lab Results   Component Value Date    LDLCALC 194 (H) 2023     Treatment with atorvastatin started 1/10/23 but pt reports medication causes 3 to 4 hours of abdominal pain daily after taking    - Stop atorvastatin, start rosuvastatin 10 mg daily  Call if continuing to have side effects     - Repeat lipids in 3 months  Orders:  -     rosuvastatin (CRESTOR) 10 MG tablet; Take 1 tablet (10 mg total) by mouth daily  -     Lipid panel; Future    3  Vitamin D deficiency  Assessment & Plan:  Vitamin D level 20 0 on 23    - Vitamin D 2000 units daily  Orders:  -     Vitamin D 25 hydroxy; Future    4  Elevated alkaline phosphatase level  Assessment & Plan:  Lab Results   Component Value Date    ALKPHOS 83 2023    ALKPHOS 131 (H) 2022     Now WNL  - Monitor  Subjective     HPI     Amharic language interpretation services were utilized for this visit  Gypsy Buchanan presented to the office for BP follow up  Pt states the she has been taking the cholesterol medication and believes it is helping her blood pressure as well   Home BP readings have been WNL with the exception of one at 153/103, has been keeping a log but forgot to bring in for review  She reports decreasing consumption of greasy and fried foods  Reporting mild abdominal pain and gas daily after taking the atorvastatin, lasting for 3 to 4 hours  Pain is not as severe if she takes the medication with food  Denies history of GERD or any current reflux symptoms  Review of Systems   Constitutional: Negative for fatigue, fever and unexpected weight change  HENT: Negative for congestion, rhinorrhea, sore throat and trouble swallowing  Eyes: Negative for visual disturbance  Respiratory: Negative for cough, shortness of breath and wheezing  Cardiovascular: Negative for chest pain, palpitations and leg swelling  Gastrointestinal: Positive for abdominal pain and diarrhea (loose stool for a few days)  Negative for constipation, nausea and vomiting  Genitourinary: Negative for difficulty urinating and dysuria  Musculoskeletal: Negative for arthralgias and myalgias  Neurological: Negative for dizziness, weakness, numbness and headaches  All other systems reviewed and are negative        Past Medical History:   Diagnosis Date   • High cholesterol    • Hypertension      Past Surgical History:   Procedure Laterality Date   •  SECTION     • HYSTERECTOMY       Family History   Problem Relation Age of Onset   • Heart disease Mother    • Hypertension Father    • Hypertension Sister    • Heart attack Brother    • Prostate cancer Paternal Grandfather      Social History     Socioeconomic History   • Marital status: Single     Spouse name: None   • Number of children: 2   • Years of education: None   • Highest education level: None   Occupational History   • None   Tobacco Use   • Smoking status: Never   • Smokeless tobacco: Never   Vaping Use   • Vaping Use: Never used   Substance and Sexual Activity   • Alcohol use: Never   • Drug use: Never   • Sexual activity: Yes     Birth control/protection: None   Other Topics Concern   • None   Social History Narrative   • None     Social Determinants of Health     Financial Resource Strain: Low Risk    • Difficulty of Paying Living Expenses: Not very hard   Food Insecurity: No Food Insecurity   • Worried About Running Out of Food in the Last Year: Never true   • Ran Out of Food in the Last Year: Never true   Transportation Needs: No Transportation Needs   • Lack of Transportation (Medical): No   • Lack of Transportation (Non-Medical):  No   Physical Activity: Not on file   Stress: Not on file   Social Connections: Not on file   Intimate Partner Violence: Not on file   Housing Stability: Not on file     Current Outpatient Medications on File Prior to Visit   Medication Sig   • albuterol (2 5 mg/3 mL) 0 083 % nebulizer solution Take 3 mL (2 5 mg total) by nebulization every 6 (six) hours as needed for wheezing or shortness of breath   • albuterol (2 5 mg/3 mL) 0 083 % nebulizer solution Take 3 mL (2 5 mg total) by nebulization every 6 (six) hours as needed for wheezing or shortness of breath   • albuterol (Ventolin HFA) 90 mcg/act inhaler Inhale 2 puffs every 6 (six) hours as needed for wheezing   • albuterol (Ventolin HFA) 90 mcg/act inhaler Inhale 2 puffs every 6 (six) hours as needed for wheezing   • B Complex Vitamins (B COMPLEX PO) Take by mouth (Patient not taking: Reported on 1/16/2023)   • Blood Pressure KIT Use in the morning   • Calcium Carbonate Antacid (CALCIUM CARBONATE PO) Take by mouth   • cetirizine-pseudoephedrine (ZyrTEC-D) 5-120 MG per tablet Take 1 tablet by mouth 2 (two) times a day   • Cholecalciferol (Vitamin D) 50 MCG (2000 UT) tablet Take 1 tablet (2,000 Units total) by mouth daily   • fluticasone (FLONASE) 50 mcg/act nasal spray 1 spray into each nostril daily   • ibuprofen (MOTRIN) 600 mg tablet Take 1 tablet (600 mg total) by mouth every 6 (six) hours as needed for moderate pain   • ibuprofen (MOTRIN) 600 mg tablet Take 1 tablet (600 mg total) by mouth every 6 (six) hours as needed for moderate pain   • VITAMIN E PO Take by mouth   • [DISCONTINUED] atorvastatin (LIPITOR) 20 mg tablet Take 1 tablet (20 mg total) by mouth daily     Allergies   Allergen Reactions   • [place] Nasal Congestion     Immunization History   Administered Date(s) Administered   • COVID-19 MODERNA VACC 0 5 ML IM 03/27/2021, 04/21/2021, 12/09/2021   • Influenza, injectable, quadrivalent, preservative free 0 5 mL 12/08/2022       Objective     /82 (BP Location: Left arm, Patient Position: Sitting, Cuff Size: Standard)   Pulse 89   Temp 97 6 °F (36 4 °C) (Temporal)   Resp 18   Ht 5' (1 524 m)   Wt 60 8 kg (134 lb)   SpO2 96%   BMI 26 17 kg/m²     Physical Exam  Vitals reviewed  Constitutional:       General: She is not in acute distress  Appearance: She is overweight  She is not ill-appearing or diaphoretic  HENT:      Head: Normocephalic and atraumatic  Eyes:      General: Lids are normal       Conjunctiva/sclera: Conjunctivae normal       Pupils: Pupils are equal, round, and reactive to light  Cardiovascular:      Rate and Rhythm: Normal rate and regular rhythm  Heart sounds: Normal heart sounds  No murmur heard  Pulmonary:      Effort: Pulmonary effort is normal  No tachypnea  Breath sounds: Normal breath sounds  No decreased breath sounds, wheezing or rales  Abdominal:      General: Abdomen is flat  Bowel sounds are normal  There is no distension  Palpations: Abdomen is soft  Tenderness: There is abdominal tenderness in the epigastric area  There is no guarding or rebound  Musculoskeletal:      Right lower leg: No edema  Left lower leg: No edema  Skin:     General: Skin is warm and dry  Neurological:      Mental Status: She is alert and oriented to person, place, and time     Psychiatric:         Mood and Affect: Mood and affect normal        Kelly Luis Fernando, CRNP

## 2023-02-02 NOTE — ASSESSMENT & PLAN NOTE
BP at goal in office today: 126/82   - Continue home BP monitoring  Call if BPs >140/90   - Continue low-salt diet and daily physical activity